# Patient Record
Sex: FEMALE | Race: BLACK OR AFRICAN AMERICAN | NOT HISPANIC OR LATINO | Employment: UNEMPLOYED | URBAN - METROPOLITAN AREA
[De-identification: names, ages, dates, MRNs, and addresses within clinical notes are randomized per-mention and may not be internally consistent; named-entity substitution may affect disease eponyms.]

---

## 2017-09-08 ENCOUNTER — HOSPITAL ENCOUNTER (EMERGENCY)
Facility: HOSPITAL | Age: 42
Discharge: HOME | End: 2017-09-08
Payer: MEDICAID

## 2017-09-08 VITALS
DIASTOLIC BLOOD PRESSURE: 79 MMHG | SYSTOLIC BLOOD PRESSURE: 134 MMHG | BODY MASS INDEX: 22.49 KG/M2 | WEIGHT: 135 LBS | HEIGHT: 65 IN | HEART RATE: 62 BPM | RESPIRATION RATE: 16 BRPM | TEMPERATURE: 98 F | OXYGEN SATURATION: 100 %

## 2017-09-08 DIAGNOSIS — K05.219 GINGIVAL ABSCESS: Primary | ICD-10-CM

## 2017-09-08 LAB
B-HCG UR QL: NEGATIVE
CTP QC/QA: YES

## 2017-09-08 PROCEDURE — 41800 DRAINAGE OF GUM LESION: CPT

## 2017-09-08 PROCEDURE — 99283 EMERGENCY DEPT VISIT LOW MDM: CPT | Mod: 25

## 2017-09-08 PROCEDURE — 25000003 PHARM REV CODE 250

## 2017-09-08 PROCEDURE — 81025 URINE PREGNANCY TEST: CPT | Performed by: EMERGENCY MEDICINE

## 2017-09-08 RX ORDER — PENICILLIN V POTASSIUM 500 MG/1
500 TABLET, FILM COATED ORAL 4 TIMES DAILY
Qty: 40 TABLET | Refills: 0 | Status: SHIPPED | OUTPATIENT
Start: 2017-09-08 | End: 2017-09-15

## 2017-09-08 RX ORDER — PENICILLIN V POTASSIUM 250 MG/1
1000 TABLET, FILM COATED ORAL
Status: COMPLETED | OUTPATIENT
Start: 2017-09-08 | End: 2017-09-08

## 2017-09-08 RX ORDER — LIDOCAINE HYDROCHLORIDE AND EPINEPHRINE 10; 10 MG/ML; UG/ML
10 INJECTION, SOLUTION INFILTRATION; PERINEURAL
Status: COMPLETED | OUTPATIENT
Start: 2017-09-08 | End: 2017-09-08

## 2017-09-08 RX ORDER — OXYCODONE AND ACETAMINOPHEN 5; 325 MG/1; MG/1
1 TABLET ORAL EVERY 4 HOURS PRN
Qty: 10 TABLET | Refills: 0 | Status: SHIPPED | OUTPATIENT
Start: 2017-09-08

## 2017-09-08 RX ADMIN — PENICILLIN V POTASSIUM 1000 MG: 250 TABLET, FILM COATED ORAL at 07:09

## 2017-09-08 RX ADMIN — LIDOCAINE HYDROCHLORIDE,EPINEPHRINE BITARTRATE 10 ML: 10; .01 INJECTION, SOLUTION INFILTRATION; PERINEURAL at 06:09

## 2017-09-08 NOTE — ED TRIAGE NOTES
Pt presents to Ed with C/O an abscess to the right side of her mouth. Pt states that she has been having tooth pain for a couple of days but the facial swelling started yesterday. Denies fever or chills. Will continue to monitor.

## 2017-09-08 NOTE — PROVIDER PROGRESS NOTES - EMERGENCY DEPT.
Encounter Date: 9/8/2017    ED Physician Progress Notes        Physician Note:   Patient now feels significantly improved, agrees to use community resource sheet to follow-up with her dental disorder and noticed return to the ER for worsening symptoms or concern or facial swelling

## 2017-09-08 NOTE — ED PROVIDER NOTES
Encounter Date: 9/8/2017    SCRIBE #1 NOTE: I, Nat De La Rosa, am scribing for, and in the presence of,  Marcus George MD. I have scribed the following portions of the note - Other sections scribed: HPI and ROS.       History     Chief Complaint   Patient presents with    Oral Swelling     Abcess to right upper gum , reports bad tooth x 2 days, denies fever.      CC: Oral Swelling    HPI: This 42 y.o. Female (LMP: 9/3/2017) with lupus presents to the ED c/o acute onset oral swelling to right upper gums that began today. Pt reports 2 day hx of severe dental pain to right upper side of mouth. Pt notes she is being treated for lupus at [facility]. Pt denies fever, chills, sore throat, nausea, and vomiting.       The history is provided by the patient. No  was used.     Review of patient's allergies indicates:  No Known Allergies  Past Medical History:   Diagnosis Date    Lupus      History reviewed. No pertinent surgical history.  History reviewed. No pertinent family history.  Social History   Substance Use Topics    Smoking status: Current Every Day Smoker     Packs/day: 1.00    Smokeless tobacco: Never Used      Comment: 1 pack lasts 2 days    Alcohol use No     Review of Systems   Constitutional: Negative for chills, diaphoresis and fever.   HENT: Positive for dental problem (Oral swelling to right upper gums and severe dental pain to right upper side of mouth). Negative for ear pain and sore throat.    Eyes: Negative for pain.   Respiratory: Negative for cough and shortness of breath.    Cardiovascular: Negative for chest pain.   Gastrointestinal: Negative for abdominal pain, diarrhea, nausea and vomiting.   Genitourinary: Negative for dysuria.   Musculoskeletal: Negative for back pain.   Skin: Negative for rash.   Neurological: Negative for headaches.       Physical Exam     Initial Vitals [09/08/17 0551]   BP Pulse Resp Temp SpO2   137/68 63 18 97.9 °F (36.6 °C) 100 %      MAP       91          Physical Exam well-developed well-nourished black female alert oriented ×3  HEENT exam pupils reactive nonicteric TMs gray nares benign, maxillary dentition broken off at the gingival margins all teeth very few teeth a 1 x 0.5 cm pocket right lateral maxillary ridge slightly fluctuant,no facial swelling  Posterior pharynx benign  Neck no significant adenopathy  Lungs clear no rales rhonchi or wheezing  Cardiovascular regular rate and rhythm no murmur rub or gallop  Abdomen bowel sounds positive soft nontender no masses or hepatosplenomegaly  Skin with chronic occasional patches of hypopigmented regions approximately one half by 1/2 cm over upper torso and lower extremities no evidence of increased warmth or fluctuance  Muscular skeletal without deformity    ED Course   I & D - Incision and Drainage  Date/Time: 9/8/2017 7:09 AM  Performed by: MALINDA WEINSTEIN  Authorized by: MALINDA WEINSTEIN   Consent Done: Not Needed  Type: abscess  Location: Right maxillary gingiva.  Anesthesia: local infiltration    Anesthesia:  Local Anesthetic: lidocaine 1% with epinephrine  Patient sedated: no  Risk factor: underlying major nerve and  underlying major vessel  Scalpel size: 11  Incision type: single straight  Complexity: simple  Drainage: pus  Drainage amount: moderate  Wound treatment: wound left open        Labs Reviewed   POCT URINE PREGNANCY             MDM patient with history of lupus and very poor dental hygiene complains of 2 day history of hot and cold intolerance hurts to chew on that side of the last 24 hours and swelling along the gingival margin on the right maxillary ridge concerning for an abscess             Scribe Attestation:   Scribe #1: I performed the above scribed service and the documentation accurately describes the services I performed. I attest to the accuracy of the note.    Attending Attestation:           Physician Attestation for Scribe:  Physician Attestation Statement for Scribe #1: Malinda CARRERA  MD Ariel, reviewed documentation, as scribed by Nat De La Rosa in my presence, and it is both accurate and complete.                 ED Course      Clinical Impression:   The encounter diagnosis was Gingival abscess.                           Marcus George MD  09/08/17 0714       Marcus George MD  09/08/17 0714

## 2019-08-07 ENCOUNTER — HOSPITAL ENCOUNTER (EMERGENCY)
Facility: HOSPITAL | Age: 44
Discharge: HOME | End: 2019-08-07
Attending: EMERGENCY MEDICINE
Payer: MEDICAID

## 2019-08-07 VITALS
TEMPERATURE: 98 F | SYSTOLIC BLOOD PRESSURE: 114 MMHG | HEART RATE: 61 BPM | HEIGHT: 66 IN | DIASTOLIC BLOOD PRESSURE: 65 MMHG | BODY MASS INDEX: 20.89 KG/M2 | WEIGHT: 130 LBS | OXYGEN SATURATION: 97 % | RESPIRATION RATE: 18 BRPM

## 2019-08-07 DIAGNOSIS — N39.0 URINARY TRACT INFECTION WITHOUT HEMATURIA, SITE UNSPECIFIED: Primary | ICD-10-CM

## 2019-08-07 LAB
B-HCG UR QL: NEGATIVE
BILIRUB UR QL STRIP: NEGATIVE
CLARITY UR: CLEAR
COLOR UR: YELLOW
CTP QC/QA: YES
GLUCOSE UR QL STRIP: NEGATIVE
HGB UR QL STRIP: ABNORMAL
KETONES UR QL STRIP: NEGATIVE
LEUKOCYTE ESTERASE UR QL STRIP: ABNORMAL
MICROSCOPIC COMMENT: ABNORMAL
NITRITE UR QL STRIP: NEGATIVE
PH UR STRIP: 5 [PH] (ref 5–8)
PROT UR QL STRIP: NEGATIVE
RBC #/AREA URNS HPF: 0 /HPF (ref 0–4)
SP GR UR STRIP: 1.02 (ref 1–1.03)
URN SPEC COLLECT METH UR: ABNORMAL
UROBILINOGEN UR STRIP-ACNC: ABNORMAL EU/DL
WBC #/AREA URNS HPF: 25 /HPF (ref 0–5)
WBC CLUMPS URNS QL MICRO: ABNORMAL

## 2019-08-07 PROCEDURE — 87186 SC STD MICRODIL/AGAR DIL: CPT

## 2019-08-07 PROCEDURE — 81025 URINE PREGNANCY TEST: CPT | Performed by: EMERGENCY MEDICINE

## 2019-08-07 PROCEDURE — 87077 CULTURE AEROBIC IDENTIFY: CPT

## 2019-08-07 PROCEDURE — 81000 URINALYSIS NONAUTO W/SCOPE: CPT

## 2019-08-07 PROCEDURE — 87088 URINE BACTERIA CULTURE: CPT

## 2019-08-07 PROCEDURE — 99284 EMERGENCY DEPT VISIT MOD MDM: CPT

## 2019-08-07 PROCEDURE — 87086 URINE CULTURE/COLONY COUNT: CPT

## 2019-08-07 RX ORDER — PHENAZOPYRIDINE HYDROCHLORIDE 200 MG/1
200 TABLET, FILM COATED ORAL 3 TIMES DAILY
Qty: 21 TABLET | Refills: 0 | Status: SHIPPED | OUTPATIENT
Start: 2019-08-07 | End: 2019-08-14

## 2019-08-07 RX ORDER — CEPHALEXIN 500 MG/1
500 CAPSULE ORAL EVERY 12 HOURS
Qty: 14 CAPSULE | Refills: 0 | Status: SHIPPED | OUTPATIENT
Start: 2019-08-07 | End: 2019-08-14

## 2019-08-07 NOTE — ED PROVIDER NOTES
Encounter Date: 2019    SCRIBE #1 NOTE: I, Jim Mckeon, am scribing for, and in the presence of,  Yan Fleming, CAROLYN. I have scribed the following portions of the note - Other sections scribed: HPI, ROS, PE.       History     Chief Complaint   Patient presents with    Dysuria     Dysuria with urinary frequency x 3 days.  Denies nausea, fever, or chills.     44 y.o. female with PMHx of Lupus presents to the ED with complaints of dysuria and associated urgency and frequency beginning 3 days ago.  Patient denies N/V/D, constipation, fever, chills, vaginal bleeding, or abdominal pain.Patient takes Lupus medication regularly and reports having control of the Sx. She has not had a Lupus flare up since she's been diagnosed 7 years ago. Her last period was 1 month ago. NKDA.  Patient states she has not been as sexually active in approximately 5 years and is not concerned for STI at this time.          Review of patient's allergies indicates:  No Known Allergies  Past Medical History:   Diagnosis Date    Lupus      Past Surgical History:   Procedure Laterality Date     SECTION, CLASSIC       History reviewed. No pertinent family history.  Social History     Tobacco Use    Smoking status: Current Every Day Smoker     Packs/day: 1.00    Smokeless tobacco: Never Used    Tobacco comment: 1 pack lasts 2 days   Substance Use Topics    Alcohol use: No    Drug use: No     Review of Systems   Constitutional: Negative for fever.   HENT: Negative for sore throat.    Respiratory: Negative for shortness of breath.    Cardiovascular: Negative for chest pain.   Gastrointestinal: Negative for constipation, diarrhea, nausea and vomiting.   Genitourinary: Positive for dysuria, frequency and urgency. Negative for vaginal bleeding.   Musculoskeletal: Negative for back pain.   Skin: Negative for rash.   Neurological: Negative for weakness.   Hematological: Does not bruise/bleed easily.       Physical Exam     Initial Vitals  [08/07/19 0941]   BP Pulse Resp Temp SpO2   (!) 145/69 68 16 98.3 °F (36.8 °C) 100 %      MAP       --         Physical Exam    Nursing note and vitals reviewed.  Constitutional: She appears well-developed and well-nourished. No distress.   HENT:   Head: Normocephalic and atraumatic.   Nose: Nose normal.   Mouth/Throat: Oropharynx is clear and moist.   Eyes: EOM are normal. Pupils are equal, round, and reactive to light.   Neck: Normal range of motion. Neck supple.   Cardiovascular: Normal rate, regular rhythm and normal heart sounds. Exam reveals no gallop and no friction rub.    No murmur heard.  Pulmonary/Chest: Breath sounds normal. No respiratory distress. She has no wheezes. She has no rhonchi. She has no rales.   Abdominal: Soft. Bowel sounds are normal. There is no tenderness. There is no rebound and no guarding.   Musculoskeletal: Normal range of motion.   Neurological: She is alert and oriented to person, place, and time. She has normal strength. No cranial nerve deficit or sensory deficit.   Skin: Skin is warm and dry. Capillary refill takes less than 2 seconds.   Psychiatric: She has a normal mood and affect.         ED Course   Procedures  Labs Reviewed   URINALYSIS, REFLEX TO URINE CULTURE - Abnormal; Notable for the following components:       Result Value    Occult Blood UA 1+ (*)     Urobilinogen, UA 2.0-3.0 (*)     Leukocytes, UA 2+ (*)     All other components within normal limits    Narrative:     Preferred Collection Type->Urine, Clean Catch   URINALYSIS MICROSCOPIC - Abnormal; Notable for the following components:    WBC, UA 25 (*)     All other components within normal limits    Narrative:     Preferred Collection Type->Urine, Clean Catch   CULTURE, URINE   POCT URINE PREGNANCY          Imaging Results    None          Medical Decision Making:   Differential Diagnosis:   Differential diagnosis includes but is not limited to:  UTI, pyelonephritis, STI, PID, TOA, vaginitis  Clinical Tests:   Lab  Tests: Ordered and Reviewed  ED Management:  This is an evaluation of a 44 y.o. female who presents to the ED for dysuria.  Vital signs are stable.   Afebrile.  Patient is nontoxic appearing and in no acute distress. Abdomen is soft and nontender to palpation. Lungs clear to auscultation.  Heart sounds are normal. UPT negative. UA shows 2+ leukocytes with 25 WBCs on microscopic analysis.  Will treat patient for UTI with Keflex.  Also discharge patient will peridium.  Will encourage patient follow-up PCP.    Patient given return precautions and instructed to return to the emergency department for any new or worsening symptoms. Patient verbalized understanding and agreed with plan.                         Clinical Impression:       ICD-10-CM ICD-9-CM   1. Urinary tract infection without hematuria, site unspecified N39.0 599.0                                Yan Fleming PA-C  08/07/19 7147

## 2019-08-09 LAB — BACTERIA UR CULT: ABNORMAL

## 2020-03-26 ENCOUNTER — HOSPITAL ENCOUNTER (EMERGENCY)
Facility: HOSPITAL | Age: 45
Discharge: HOME | End: 2020-03-26
Attending: EMERGENCY MEDICINE
Payer: MEDICAID

## 2020-03-26 VITALS
OXYGEN SATURATION: 100 % | RESPIRATION RATE: 16 BRPM | BODY MASS INDEX: 22.94 KG/M2 | SYSTOLIC BLOOD PRESSURE: 161 MMHG | DIASTOLIC BLOOD PRESSURE: 87 MMHG | HEART RATE: 84 BPM | TEMPERATURE: 99 F | WEIGHT: 140 LBS

## 2020-03-26 DIAGNOSIS — R20.0 LEFT UPPER EXTREMITY NUMBNESS: Primary | ICD-10-CM

## 2020-03-26 PROCEDURE — 99283 EMERGENCY DEPT VISIT LOW MDM: CPT | Mod: ER

## 2020-03-26 RX ORDER — HYDROXYCHLOROQUINE SULFATE 200 MG/1
200 TABLET, FILM COATED ORAL 2 TIMES DAILY
Qty: 28 TABLET | Refills: 0 | Status: SHIPPED | OUTPATIENT
Start: 2020-03-26 | End: 2020-04-09

## 2020-03-26 NOTE — DISCHARGE INSTRUCTIONS
Thank you for allowing me to care for you today.  I hope our treatment plan will make you feel better in the next few days.  In order for me to take better care of my future patients and improve our Emergency Department, I would appreciate if you can provide us with feedback.  In the next few days, you may receive a survey in the mail.  If you do, it would mean a great deal to me if you would please take the time to complete it.    Thank you and I hope you feel better.  Dunia Hagen NP

## 2020-03-26 NOTE — ED NOTES
Patient got into an argument with her family in the lobby and left the ED. Provider saw the patient in the lobby. Patient left without her velcro splint and discharge paperwork.

## 2020-03-26 NOTE — ED PROVIDER NOTES
"Encounter Date: 3/26/2020    SCRIBE #1 NOTE: I, Edward Ray, am scribing for, and in the presence of,  Dunia Hagen NP. I have scribed the following portions of the note - Other sections scribed: HPI, ROS, PE.       History     Chief Complaint   Patient presents with    Numbness     to lower part of left arm for two days. HX of lupus     45 year old female with lupus presents to the ED with a constant numbness to her left forearm onset 3 days. Denies any trauma/injury and has never felt this numbness in the past. States her arm feels "tight." Denies any pain to her arms, weakness, or visual changes. Has not taken any medications for symptoms. Patient says she was supposed to visit [facility], but missed her appointment too many times and requests to refill her medications for her lupus. Patient is a liane at work.    The history is provided by the patient. No  was used.     Review of patient's allergies indicates:  No Known Allergies  Past Medical History:   Diagnosis Date    Lupus      Past Surgical History:   Procedure Laterality Date     SECTION, CLASSIC       No family history on file.  Social History     Tobacco Use    Smoking status: Current Every Day Smoker     Packs/day: 0.50     Types: Cigarettes    Smokeless tobacco: Never Used    Tobacco comment: 1 pack lasts 2 days   Substance Use Topics    Alcohol use: No     Comment: occass    Drug use: No     Review of Systems   Constitutional: Negative for chills and fever.   HENT: Negative for congestion.    Eyes: Negative for visual disturbance.   Respiratory: Negative for cough and wheezing.    Gastrointestinal: Negative for abdominal pain, nausea and vomiting.   Genitourinary: Negative for dysuria.   Musculoskeletal: Negative for arthralgias and myalgias.   Skin: Negative for wound.   Neurological: Positive for numbness. Negative for weakness.   All other systems reviewed and are negative.      Physical Exam     Initial " Vitals [03/26/20 1543]   BP Pulse Resp Temp SpO2   (!) 161/87 84 16 98.7 °F (37.1 °C) 100 %      MAP       --         Physical Exam    Nursing note and vitals reviewed.  Constitutional: She appears well-developed and well-nourished. She is active.  Non-toxic appearance. She does not appear ill.   The patient is alert, oriented, and speaking in complete sentences.  No apparent distress.   HENT:   Head: Normocephalic and atraumatic.   Right Ear: Hearing and external ear normal.   Left Ear: Hearing and external ear normal.   Nose: Nose normal.   Mouth/Throat: Uvula is midline, oropharynx is clear and moist and mucous membranes are normal. No tonsillar abscesses.   No facial asymmetry   Eyes: Conjunctivae and EOM are normal. Pupils are equal, round, and reactive to light. Right eye exhibits normal extraocular motion. Left eye exhibits normal extraocular motion. Right pupil is round and reactive. Left pupil is round and reactive. Pupils are equal.   Neck: Normal range of motion and full passive range of motion without pain. Neck supple. No neck rigidity.   No meningismus   Cardiovascular: Normal rate, regular rhythm, normal heart sounds and normal pulses. Exam reveals no gallop and no friction rub.    No murmur heard.  Pulses:       Radial pulses are 2+ on the right side, and 2+ on the left side.        Dorsalis pedis pulses are 2+ on the right side, and 2+ on the left side.   Pulmonary/Chest: Effort normal and breath sounds normal. No respiratory distress. She has no decreased breath sounds. She has no wheezes. She has no rhonchi. She has no rales.   Abdominal: Soft. Bowel sounds are normal. She exhibits no distension. There is no tenderness.   Musculoskeletal: Normal range of motion.   No sign of injury to left forearm with no erythema, swelling, or ecchymosis. Warmth to touch.  Cap refill is within normal limits.  Full range of motion to all joints of left upper extremity.    Neurological: She is alert and oriented to  person, place, and time. She has normal strength. No cranial nerve deficit or sensory deficit. She exhibits normal muscle tone. She displays a negative Romberg sign. She displays no seizure activity. Coordination and gait normal. GCS score is 15. GCS eye subscore is 4. GCS verbal subscore is 5. GCS motor subscore is 6.   No sensory deficits.  Muscular strength is normal and equal bilaterally.  No pronator drift.  Gait is steady and even.  Finger to nose, heel to shin, and alternating hands are all within normal limits.    Negative Tinel's and negative Phalen's.   Skin: Skin is warm, dry and intact. Capillary refill takes less than 2 seconds. No rash noted.   Psychiatric: She has a normal mood and affect. Her speech is normal and behavior is normal. Judgment and thought content normal. Cognition and memory are normal.         ED Course   Procedures  Labs Reviewed   POCT URINE PREGNANCY          Imaging Results    None          Medical Decision Making:   History:   Old Medical Records: I decided to obtain old medical records.  Clinical Tests:   Lab Tests: Ordered and Reviewed  The following lab test(s) were unremarkable: UPT  ED Management:  This is an emergent evaluation of a 45-year-old female who presents to the ED with complaints of left forearm numbness for the past several days.    Physical exam is benign.  There are no focal neurologic deficits.  Negative Phalen's and Tinel's.    Based on history and physical exam, I considered but do not suspect CVA, ICH, or meningitis.  Clinical presentation suggests possible carpal tunnel, tendinitis, or other orthopedic cause of the patient's symptoms.  Plan was to place Velcro wrist splint and have her follow-up with primary care and Orthopedics.  However, she left after having a verbal altercation with a family member before we were able to give discharge instructions or give her the Velcro splint.  Case discussed with supervising physician.                Aaliyah  Attestation:   Scribe #1: I performed the above scribed service and the documentation accurately describes the services I performed. I attest to the accuracy of the note.    I, AC Sigala, WILLIAMP-BC, SERINA-BC, personally performed the services described in this documentation. All medical record entries made by the scribe were at my direction and in my presence. I have reviewed the chart and agree that the record reflects my personal performance and is accurate and complete. AC Sigala, WILLIAMP-BC, ESTEPHANIAP-BC 5:36 PM 03/26/2020                      Clinical Impression:     1. Left upper extremity numbness                                   Dunia Hagen NP  03/26/20 [id number]

## 2020-06-20 ENCOUNTER — HOSPITAL ENCOUNTER (EMERGENCY)
Facility: HOSPITAL | Age: 45
Discharge: HOME | End: 2020-06-20
Attending: EMERGENCY MEDICINE
Payer: MEDICAID

## 2020-06-20 VITALS
BODY MASS INDEX: 21.66 KG/M2 | HEART RATE: 70 BPM | WEIGHT: 130 LBS | TEMPERATURE: 98 F | HEIGHT: 65 IN | OXYGEN SATURATION: 100 % | RESPIRATION RATE: 18 BRPM | DIASTOLIC BLOOD PRESSURE: 77 MMHG | SYSTOLIC BLOOD PRESSURE: 169 MMHG

## 2020-06-20 DIAGNOSIS — R05.9 COUGH: ICD-10-CM

## 2020-06-20 DIAGNOSIS — B34.9 VIRAL SYNDROME: Primary | ICD-10-CM

## 2020-06-20 LAB — SARS-COV-2 RDRP RESP QL NAA+PROBE: NEGATIVE

## 2020-06-20 PROCEDURE — 99284 EMERGENCY DEPT VISIT MOD MDM: CPT | Mod: 25

## 2020-06-20 PROCEDURE — U0002 COVID-19 LAB TEST NON-CDC: HCPCS

## 2020-06-20 RX ORDER — BENZONATATE 100 MG/1
100 CAPSULE ORAL 3 TIMES DAILY PRN
Qty: 20 CAPSULE | Refills: 0 | Status: SHIPPED | OUTPATIENT
Start: 2020-06-20

## 2020-06-20 RX ORDER — ACETAMINOPHEN 500 MG
500 TABLET ORAL EVERY 4 HOURS PRN
Qty: 30 TABLET | Refills: 0 | OUTPATIENT
Start: 2020-06-20 | End: 2021-12-15

## 2020-06-20 RX ORDER — CETIRIZINE HYDROCHLORIDE 10 MG/1
10 TABLET ORAL DAILY
Qty: 30 TABLET | Refills: 0 | Status: SHIPPED | OUTPATIENT
Start: 2020-06-20

## 2020-06-20 RX ORDER — GUAIFENESIN/DEXTROMETHORPHAN 100-10MG/5
5 SYRUP ORAL 4 TIMES DAILY PRN
Qty: 120 ML | Refills: 0 | Status: SHIPPED | OUTPATIENT
Start: 2020-06-20 | End: 2020-06-30

## 2020-06-20 RX ORDER — HYDROXYCHLOROQUINE SULFATE 200 MG/1
200 TABLET, FILM COATED ORAL
COMMUNITY
Start: 2014-04-14

## 2020-06-20 RX ORDER — FLUTICASONE PROPIONATE 50 MCG
1 SPRAY, SUSPENSION (ML) NASAL 2 TIMES DAILY PRN
Qty: 15 G | Refills: 0 | Status: SHIPPED | OUTPATIENT
Start: 2020-06-20

## 2020-06-20 RX ORDER — TRAMADOL HYDROCHLORIDE AND ACETAMINOPHEN 37.5; 325 MG/1; MG/1
1 TABLET, FILM COATED ORAL EVERY 4 HOURS
COMMUNITY

## 2020-06-20 NOTE — DISCHARGE INSTRUCTIONS

## 2020-06-20 NOTE — ED PROVIDER NOTES
Encounter Date: 2020    SCRIBE #1 NOTE: I, Elise Briceño, am scribing for, and in the presence of,  Suhail Israel NP. I have scribed the following portions of the note - Other sections scribed: HPI/ROS/PE.       History     Chief Complaint   Patient presents with    Nasal Congestion     cold and cough x 5 days.   + vomiting x 5 days ago.   denies today.  denies fever or chills.     Pt seen by provider at 09:28AM    This 45 y.o. female with a medical history of Lupus presents to the ED for an emergent evaluation of a cough and nasal congestion x 5 days. Pt states she has a cold. Pt reports n/v at initial onset of symptoms, but n/v have now resolved. No recent, known sick contacts. No modifying factors. Pt attempted tx with Theraflu previously. Of note, pt reports one of her grandchildren was sick ~1 week ago. Otherwise, pt denies fever, chills, rhinorrhea, sore throat, ear pain, chest pain, SOB, abdominal pain, diarrhea, and any other associated symptoms.    The history is provided by the patient. No  was used.     Review of patient's allergies indicates:  No Known Allergies  Past Medical History:   Diagnosis Date    Lupus      Past Surgical History:   Procedure Laterality Date     SECTION, CLASSIC       History reviewed. No pertinent family history.  Social History     Tobacco Use    Smoking status: Current Every Day Smoker     Packs/day: 0.50     Types: Cigarettes    Smokeless tobacco: Never Used    Tobacco comment: 1 pack lasts 2 days   Substance Use Topics    Alcohol use: Yes     Comment: occass    Drug use: Yes     Types: Marijuana     Review of Systems   Constitutional: Negative for chills and fever.   HENT: Positive for congestion. Negative for ear pain, rhinorrhea and sore throat.    Eyes: Negative for visual disturbance.   Respiratory: Positive for cough. Negative for shortness of breath.    Cardiovascular: Negative for chest pain.   Gastrointestinal: Positive for  nausea (now resolved) and vomiting (now resolved). Negative for abdominal pain and diarrhea.   Genitourinary: Negative for difficulty urinating and dysuria.   Musculoskeletal: Negative for neck stiffness.   Skin: Negative for rash.   Neurological: Negative for headaches.       Physical Exam     Initial Vitals [06/20/20 0916]   BP Pulse Resp Temp SpO2   (!) 140/78 67 16 98.4 °F (36.9 °C) 100 %      MAP       --         Physical Exam    Nursing note and vitals reviewed.  Constitutional: She appears well-developed and well-nourished. She is not diaphoretic. She is active and cooperative. She does not have a sickly appearance. She does not appear ill. No distress.   HENT:   Head: Normocephalic and atraumatic.   Right Ear: External ear normal.   Left Ear: External ear normal.   Nose: Nose normal.   Mouth/Throat: Oropharynx is clear and moist.   TMs and ear canals are normal bilaterally.  No frontal or maxillary sinus tenderness.  No oropharyngeal abnormalities.  No trismus.  Tolerating secretions without difficulty.   Eyes: Conjunctivae and EOM are normal. Pupils are equal, round, and reactive to light. Right eye exhibits no discharge. Left eye exhibits no discharge.   Neck: Trachea normal, normal range of motion, full passive range of motion without pain and phonation normal. Neck supple. No stridor present. No tracheal tenderness, no spinous process tenderness and no muscular tenderness present. No tracheal deviation, no edema, no erythema and normal range of motion present. No neck rigidity.   Neck is supple with nonpainful active and passive range of motion.  No nuchal rigidity.  No stridor or drooling.  No change in phonation.   Cardiovascular: Normal rate and regular rhythm.   Pulmonary/Chest: Effort normal and breath sounds normal. No stridor. No tachypnea and no bradypnea. No respiratory distress.   Lungs are clear to auscultation bilaterally in all fields.  Respiratory effort is normal.   Abdominal: Soft. She  exhibits no distension. There is no abdominal tenderness.   Abdominal exam is benign.   Musculoskeletal: Normal range of motion. No tenderness or edema.   Neurological: She is alert and oriented to person, place, and time. She has normal strength. No cranial nerve deficit or sensory deficit.   Skin: Skin is warm and dry. No rash noted.   Psychiatric: She has a normal mood and affect. Her behavior is normal. Judgment and thought content normal.         ED Course   Procedures  Labs Reviewed   SARS-COV-2 RNA AMPLIFICATION, QUAL          Imaging Results          X-Ray Chest AP Portable (Final result)  Result time 06/20/20 10:00:56    Final result by Geri Gtz MD (06/20/20 10:00:56)                 Impression:      No acute cardiopulmonary abnormality.      Electronically signed by: Geri Gtz  Date:    06/20/2020  Time:    10:00             Narrative:    EXAMINATION:  XR CHEST AP PORTABLE    CLINICAL HISTORY:  Cough    TECHNIQUE:  Single view of the chest was obtained.    COMPARISON:  None    FINDINGS:  Normal cardiomediastinal contour. No focal consolidation, pleural effusion or pneumothorax.                                 Medical Decision Making:   History:   Old Medical Records: I decided to obtain old medical records.  Clinical Tests:   Lab Tests: Ordered and Reviewed  Radiological Study: Ordered and Reviewed  ED Management:  DDx:  Viral syndrome, COVID-19, strep pharyngitis, viral pharyngitis, otitis media, sinusitis, pneumonia, bronchitis, meningitis, sepsis, others    HPI and physical exam as above.      The patient appears to have a viral infection.  Given the widespread activity of the COVID-19 pandemic in our area at this time there is a possibility that it is the etiology of the patient's symptoms.  Based upon the history and physical exam the patient does not appear to have a serious bacterial infection such as sepsis, otitis media, bacterial sinusitis, strep pharyngitis, parapharyngeal or  peritonsillar abscess, meningitis. Chest x-ray shows no evidence of pneumonia or other acute abnormality.  Respiratory effort is normal. Lungs are clear to auscultation bilaterally in all fields. Mucous membranes are moist and the patient is tolerating P.O. without difficulty.  The patient's RNA amplification test for SARS-COV-2 was negative, however there is a possibility of this being a false negative.  Patient is afebrile.  Patient is nontoxic, alert, active, and appears very well at this time just prior to discharge.  Room air oxygen saturation 100%.  I have given specific return precautions to the patient regarding dyspnea.  I will prescribe medications to treat the patient's symptoms.     Advised patient to wear a mask at all times if she must be around others.  Advised patient to follow up with her PCP for re-evaluation and further management.  ED return precautions given. All questions regarding diagnosis and plan were answered to the patient's fullest possible satisfaction. Patient expressed understanding of diagnosis, discharge instructions, and return precautions.            Patient note was created using [employer] voice dictation software.  Any errors in syntax or information may not have been identified and edited prior to signing this note.              Scribe Attestation:   Scribe #1: I performed the above scribed service and the documentation accurately describes the services I performed. I attest to the accuracy of the note.                          Clinical Impression:       ICD-10-CM ICD-9-CM   1. Viral syndrome  B34.9 079.99   2. Cough  R05 786.2         Disposition:   Disposition: Discharged  Condition: Stable   Scribe Attestation: I, Suhail Israel NP, personally performed the services described in this documentation. All medical record entries made by the scribe were at my direction and in my presence. I have reviewed the chart and agree that the record reflects my personal performance and is  accurate and complete.   ED Disposition Condition    Discharge Stable        ED Prescriptions     Medication Sig Dispense Start Date End Date Auth. Provider    acetaminophen (TYLENOL) 500 MG tablet Take 1 tablet (500 mg total) by mouth every 4 (four) hours as needed (Fever). 30 tablet 6/20/2020  Suhail Israel NP    benzonatate (TESSALON) 100 MG capsule Take 1 capsule (100 mg total) by mouth 3 (three) times daily as needed for Cough. 20 capsule 6/20/2020  Suhail Israel NP    cetirizine (ZYRTEC) 10 MG tablet Take 1 tablet (10 mg total) by mouth once daily. 30 tablet 6/20/2020  Suhail Israel NP    dextromethorphan-guaifenesin  mg/5 ml (ROBITUSSIN-DM)  mg/5 mL liquid Take 5 mLs by mouth 4 (four) times daily as needed (cough). 120 mL 6/20/2020 6/30/2020 Suhail Israel NP    fluticasone propionate (FLONASE) 50 mcg/actuation nasal spray 1 spray (50 mcg total) by Each Nostril route 2 (two) times daily as needed. 15 g 6/20/2020  Suhail Israel NP        Follow-up Information     Follow up With Specialties Details Why Contact Info    [facility]  Schedule an appointment as soon as possible for a visit in 1 week For further evaluation [address] [place] [address]  [phone]      [facility] Emergency Medicine Go to  If symptoms worsen, As needed [address] [facility] [id number] [phone]                                     Suhail Israel NP  06/21/20 4075

## 2020-06-20 NOTE — ED TRIAGE NOTES
"Presents with cough and nasal congestion "since about Tuesday". Pt also reports that she has vomited this week, denies fever, SOB.   "

## 2021-04-16 ENCOUNTER — PATIENT MESSAGE (OUTPATIENT)
Dept: RESEARCH | Facility: HOSPITAL | Age: 46
End: 2021-04-16

## 2021-12-15 ENCOUNTER — HOSPITAL ENCOUNTER (EMERGENCY)
Facility: HOSPITAL | Age: 46
Discharge: HOME | End: 2021-12-15
Attending: EMERGENCY MEDICINE
Payer: MEDICAID

## 2021-12-15 VITALS
HEART RATE: 71 BPM | BODY MASS INDEX: 24.16 KG/M2 | TEMPERATURE: 98 F | DIASTOLIC BLOOD PRESSURE: 74 MMHG | OXYGEN SATURATION: 99 % | RESPIRATION RATE: 18 BRPM | SYSTOLIC BLOOD PRESSURE: 150 MMHG | WEIGHT: 145 LBS | HEIGHT: 65 IN

## 2021-12-15 DIAGNOSIS — K08.89 PAIN, DENTAL: Primary | ICD-10-CM

## 2021-12-15 DIAGNOSIS — L03.211 FACIAL CELLULITIS: ICD-10-CM

## 2021-12-15 LAB
B-HCG UR QL: NEGATIVE
CTP QC/QA: YES

## 2021-12-15 PROCEDURE — 25000003 PHARM REV CODE 250: Performed by: PHYSICIAN ASSISTANT

## 2021-12-15 PROCEDURE — 81025 URINE PREGNANCY TEST: CPT | Performed by: NURSE PRACTITIONER

## 2021-12-15 PROCEDURE — 99284 EMERGENCY DEPT VISIT MOD MDM: CPT

## 2021-12-15 RX ORDER — AMOXICILLIN 500 MG/1
500 CAPSULE ORAL 3 TIMES DAILY
Qty: 21 CAPSULE | Refills: 0 | Status: SHIPPED | OUTPATIENT
Start: 2021-12-15 | End: 2021-12-22

## 2021-12-15 RX ORDER — IBUPROFEN 600 MG/1
600 TABLET ORAL EVERY 6 HOURS PRN
Qty: 20 TABLET | Refills: 0 | Status: SHIPPED | OUTPATIENT
Start: 2021-12-15 | End: 2021-12-20

## 2021-12-15 RX ORDER — IBUPROFEN 600 MG/1
600 TABLET ORAL
Status: COMPLETED | OUTPATIENT
Start: 2021-12-15 | End: 2021-12-15

## 2021-12-15 RX ORDER — ACETAMINOPHEN 500 MG
500 TABLET ORAL EVERY 4 HOURS PRN
Qty: 20 TABLET | Refills: 0 | Status: SHIPPED | OUTPATIENT
Start: 2021-12-15 | End: 2021-12-20

## 2021-12-15 RX ORDER — AMOXICILLIN 250 MG/1
500 CAPSULE ORAL
Status: COMPLETED | OUTPATIENT
Start: 2021-12-15 | End: 2021-12-15

## 2021-12-15 RX ADMIN — AMOXICILLIN 500 MG: 250 CAPSULE ORAL at 03:12

## 2021-12-15 RX ADMIN — IBUPROFEN 600 MG: 600 TABLET, FILM COATED ORAL at 03:12

## 2022-08-26 ENCOUNTER — HOSPITAL ENCOUNTER (EMERGENCY)
Facility: HOSPITAL | Age: 47
Discharge: HOME | End: 2022-08-26
Attending: EMERGENCY MEDICINE
Payer: MEDICAID

## 2022-08-26 VITALS
TEMPERATURE: 98 F | BODY MASS INDEX: 21.66 KG/M2 | RESPIRATION RATE: 18 BRPM | OXYGEN SATURATION: 100 % | WEIGHT: 130 LBS | SYSTOLIC BLOOD PRESSURE: 111 MMHG | HEART RATE: 60 BPM | HEIGHT: 65 IN | DIASTOLIC BLOOD PRESSURE: 69 MMHG

## 2022-08-26 DIAGNOSIS — N30.01 ACUTE CYSTITIS WITH HEMATURIA: Primary | ICD-10-CM

## 2022-08-26 LAB
B-HCG UR QL: NEGATIVE
BILIRUBIN, POC UA: NEGATIVE
BLOOD, POC UA: ABNORMAL
CLARITY, POC UA: CLEAR
COLOR, POC UA: YELLOW
CTP QC/QA: YES
GLUCOSE, POC UA: NEGATIVE
KETONES, POC UA: NEGATIVE
LEUKOCYTE EST, POC UA: ABNORMAL
NITRITE, POC UA: NEGATIVE
PH UR STRIP: 7 [PH]
PROTEIN, POC UA: NEGATIVE
SPECIFIC GRAVITY, POC UA: 1.02
UROBILINOGEN, POC UA: 0.2 E.U./DL

## 2022-08-26 PROCEDURE — 25000003 PHARM REV CODE 250: Mod: ER | Performed by: NURSE PRACTITIONER

## 2022-08-26 PROCEDURE — 81003 URINALYSIS AUTO W/O SCOPE: CPT | Mod: ER

## 2022-08-26 PROCEDURE — 81025 URINE PREGNANCY TEST: CPT | Mod: ER | Performed by: EMERGENCY MEDICINE

## 2022-08-26 PROCEDURE — 99284 EMERGENCY DEPT VISIT MOD MDM: CPT | Mod: 25,ER

## 2022-08-26 PROCEDURE — 87086 URINE CULTURE/COLONY COUNT: CPT | Performed by: NURSE PRACTITIONER

## 2022-08-26 RX ORDER — PHENAZOPYRIDINE HYDROCHLORIDE 200 MG/1
200 TABLET, FILM COATED ORAL 3 TIMES DAILY
Qty: 6 TABLET | Refills: 0 | Status: SHIPPED | OUTPATIENT
Start: 2022-08-26 | End: 2022-08-28

## 2022-08-26 RX ORDER — CEPHALEXIN 500 MG/1
500 CAPSULE ORAL
Status: COMPLETED | OUTPATIENT
Start: 2022-08-26 | End: 2022-08-26

## 2022-08-26 RX ORDER — CEPHALEXIN 500 MG/1
500 CAPSULE ORAL 4 TIMES DAILY
Qty: 20 CAPSULE | Refills: 0 | Status: SHIPPED | OUTPATIENT
Start: 2022-08-26 | End: 2022-08-31

## 2022-08-26 RX ADMIN — CEPHALEXIN 500 MG: 500 CAPSULE ORAL at 08:08

## 2022-08-26 NOTE — DISCHARGE INSTRUCTIONS
Thank you for coming to our Emergency Department today. It is important to remember that some problems or medical conditions are difficult to diagnose and may not be found during your Emergency Department visit.     Be sure to follow up with your primary care doctor and review all labs/imaging/tests that were performed during your ER visit with them. Some labs/tests may be outside of the normal range and require non-emergent follow-up and further investigation to help diagnose/exclude/prevent complications or other potentially serious medical conditions that were not addressed during your ER visit.    If you do not have a primary care doctor, you may contact the one listed on your discharge paperwork or you may also call the [facility] Appointment Desk at [phone] to schedule an appointment and establish care with one. It is important to your health that you have a primary care doctor.    Please take all medications as directed. All medications may potentially have side-effects and it is impossible to predict which medications may give you side-effects or what side-effects (if any) they will give you.. If you feel that you are having a negative effect or side-effect of any medication you should immediately stop taking them and seek medical attention. If you feel that you are having a life-threatening reaction call 911.    Return to the ER with any questions/concerns, new/concerning symptoms, worsening or failure to improve.     Do not drive, swim, climb to height, take a bath, operate heavy machinery, drink alcohol or take potentially sedating medications, sign any legal documents or make any important decisions for 24 hours if you have received any pain medications, sedatives or mood altering drugs during your ER visit or within 24 hours of taking them if they have been prescribed to you.     You can find additional resources for Dentists, hearing aids, durable medical equipment, low cost pharmacies and  other resources at https://[facility].org    BELOW THIS LINE ONLY APPLIES IF YOU HAVE A COVID TEST PENDING OR IF YOU HAVE BEEN DIAGNOSED WITH COVID:  Please access [facility] to review the results of your test. Until the results of your COVID test return, you should isolate yourself so as not to potentially spread illness to others.   If your COVID test returns positive, you should isolate yourself so as not to spread illness to others. After five full days, if you are feeling better and you have not had fever for 24 hours, you can return to your typical daily activities, but you must wear a mask around others for an additional 5 days.   If your COVID test returns negative and you are either unvaccinated or more than six months out from your two-dose vaccine and are not yet boosted, you should still quarantine for 5 full days followed by strict mask use for an additional 5 full days.   If your COVID test returns negative and you have received your 2-dose initial vaccine as well as a booster, you should continue strict mask use for 10 full days after the exposure.  For all those exposed, best practice includes a test at day 5 after the exposure. This can be a home test or a test through one of the many testing centers throughout our community.   Masking is always advised to limit the spread of COVID. Cdc.gov is an excellent site to obtain the latest up to date recommendations regarding COVID and COVID testing.     CDC Testing and Quarantine Guidelines for patients with exposure to a known-positive COVID-19 person:  A close exposure is defined as anyone who has had an exposure (masked or unmasked) to a known COVID -19 positive person within 6 feet of someone for a cumulative total of 15 minutes or more over a 24-hour period.   Vaccinated and/or if you recently had a positive covid test within 90 days do NOT need to quarantine after contact with someone who had COVID-19 unless you develop symptoms.   Fully vaccinated  people who have not had a positive test within 90 days, should get tested 3-5 days after their exposure, even if they don't have symptoms and wear a mask indoors in public for 14 days following exposure or until their test result is negative.      Unvaccinated and/or NOT had a positive test within 90 days and meet close exposure  You are required by CDC guidelines to quarantine for at least 5 days from time of exposure followed by 5 days of strict masking. It is recommended, but not required to test after 5 days, unless you develop symptoms, in which case you should test at that time.  If you get tested after 5 days and your test is positive, your 5 day period of isolation starts the day of the positive test.    If your exposure does not meet the above definition, you can return to your normal daily activities to include social distancing, wearing a mask and frequent handwashing.      Here is a link to guidance from the CDC:  https://www.cdc.gov/media/releases/2021/s1227-isolation-quarantine-guidance.html      [place] Dept Of Health Testing Sites:  https://ldh.[place].gov/page/3934      [facility] website with testing locations and guidance:  [email or web address]

## 2022-08-26 NOTE — ED PROVIDER NOTES
Encounter Date: 2022       History     Chief Complaint   Patient presents with    Dysuria     Pain with urination x 2 days, denies vag d/c or n/v/fever     CC: Dysuria    HPI:  This is a 47-year-old female with lupus presenting to the ED with 2 day history of dysuria and urinary frequency.  Denies any fever, chills, flank pain, abdominal pain, vaginal bleeding or discharge.  Denies concern for STD.  No recent antibiotic use.  No attempted treatment prior to arrival.    The history is provided by the patient. No  was used.     Review of patient's allergies indicates:  No Known Allergies  Past Medical History:   Diagnosis Date    Lupus      Past Surgical History:   Procedure Laterality Date     SECTION, CLASSIC       History reviewed. No pertinent family history.  Social History     Tobacco Use    Smoking status: Current Every Day Smoker     Packs/day: 0.50     Types: Cigarettes    Smokeless tobacco: Never Used    Tobacco comment: 1 pack lasts 2 days   Substance Use Topics    Alcohol use: Yes     Comment: occass    Drug use: Yes     Types: Marijuana     Review of Systems   Constitutional: Negative for chills and fever.   HENT: Negative for sore throat.    Respiratory: Negative for shortness of breath.    Cardiovascular: Negative for chest pain.   Gastrointestinal: Negative for nausea.   Genitourinary: Positive for dysuria and frequency.   Musculoskeletal: Negative for back pain.   Skin: Negative for rash.   Neurological: Negative for weakness.   Hematological: Does not bruise/bleed easily.       Physical Exam     Initial Vitals [22 0823]   BP Pulse Resp Temp SpO2   111/69 60 18 97.8 °F (36.6 °C) 100 %      MAP       --         Physical Exam    Constitutional: She appears well-developed and well-nourished. She is not diaphoretic. No distress.   HENT:   Head: Normocephalic and atraumatic.   Neck:   Normal range of motion.  Cardiovascular: Normal rate, regular rhythm, normal  heart sounds and intact distal pulses.   Pulmonary/Chest: Breath sounds normal. No respiratory distress.   Abdominal: Abdomen is soft. Bowel sounds are normal. There is no abdominal tenderness.   Musculoskeletal:         General: Normal range of motion.      Cervical back: Normal range of motion.     Neurological: She is alert and oriented to person, place, and time.   Skin: Skin is warm and dry.   Psychiatric: She has a normal mood and affect. Her behavior is normal.         ED Course   Procedures  Labs Reviewed   POCT URINALYSIS W/O SCOPE - Abnormal; Notable for the following components:       Result Value    Blood, UA Trace-intact (*)     Leukocytes, UA 1+ (*)     All other components within normal limits   CULTURE, URINE   POCT URINALYSIS W/O SCOPE   POCT URINE PREGNANCY          Imaging Results    None          Medications   cephALEXin capsule 500 mg (500 mg Oral Given 8/26/22 0858)     Medical Decision Making:   ED Management:  47-year-old female presenting to the ED with dysuria and urinary frequency.  UPT negative.  1+ leukocytes on urinalysis.  Urine culture pending.  Will treat with cephalexin and Pyridium.  No CT findings concerning for pyelonephritis or obstructive uropathy.  Denies concern for STI.  Follow-up with PCP.                      Clinical Impression:   Final diagnoses:  [N30.01] Acute cystitis with hematuria (Primary)          ED Disposition Condition    Discharge Stable        ED Prescriptions     Medication Sig Dispense Start Date End Date Auth. Provider    cephALEXin (KEFLEX) 500 MG capsule Take 1 capsule (500 mg total) by mouth 4 (four) times daily. for 5 days 20 capsule 8/26/2022 8/31/2022 Ingrid Chino NP    phenazopyridine (PYRIDIUM) 200 MG tablet Take 1 tablet (200 mg total) by mouth 3 (three) times daily. for 2 days 6 tablet 8/26/2022 8/28/2022 Ingrid Chino NP        Follow-up Information     Follow up With Specialties Details Why Contact Info    [facility]   Schedule an appointment as soon as possible for a visit in 1 day For follow-up if you do not have a primary care doctor [address] [facility]  [place] [address]  [phone]      [facility] ED Emergency Medicine Go to  If symptoms worsen [address]  [facility] [id number] [phone]           Ingrid Chino NP  08/26/22 0859

## 2022-08-28 LAB — BACTERIA UR CULT: NORMAL

## 2023-07-21 ENCOUNTER — HOSPITAL ENCOUNTER (EMERGENCY)
Facility: HOSPITAL | Age: 48
Discharge: HOME | End: 2023-07-21
Attending: STUDENT IN AN ORGANIZED HEALTH CARE EDUCATION/TRAINING PROGRAM
Payer: MEDICAID

## 2023-07-21 VITALS
OXYGEN SATURATION: 98 % | TEMPERATURE: 98 F | DIASTOLIC BLOOD PRESSURE: 75 MMHG | HEART RATE: 64 BPM | SYSTOLIC BLOOD PRESSURE: 137 MMHG | RESPIRATION RATE: 16 BRPM | HEIGHT: 65 IN | BODY MASS INDEX: 23.32 KG/M2 | WEIGHT: 140 LBS

## 2023-07-21 DIAGNOSIS — M25.539 WRIST PAIN: ICD-10-CM

## 2023-07-21 LAB
B-HCG UR QL: NEGATIVE
CTP QC/QA: YES

## 2023-07-21 PROCEDURE — 99283 EMERGENCY DEPT VISIT LOW MDM: CPT | Mod: 25,ER

## 2023-07-21 PROCEDURE — 25000003 PHARM REV CODE 250: Mod: ER | Performed by: STUDENT IN AN ORGANIZED HEALTH CARE EDUCATION/TRAINING PROGRAM

## 2023-07-21 PROCEDURE — 81025 URINE PREGNANCY TEST: CPT | Mod: ER | Performed by: STUDENT IN AN ORGANIZED HEALTH CARE EDUCATION/TRAINING PROGRAM

## 2023-07-21 PROCEDURE — 81025 URINE PREGNANCY TEST: CPT | Mod: ER

## 2023-07-21 RX ORDER — AMLODIPINE BESYLATE 5 MG/1
1 TABLET ORAL DAILY
COMMUNITY
Start: 2023-05-22

## 2023-07-21 RX ORDER — IBUPROFEN 400 MG/1
400 TABLET ORAL
Status: COMPLETED | OUTPATIENT
Start: 2023-07-21 | End: 2023-07-21

## 2023-07-21 RX ADMIN — IBUPROFEN 400 MG: 400 TABLET ORAL at 09:07

## 2023-07-21 NOTE — ED PROVIDER NOTES
Encounter Date: 2023    SCRIBE #1 NOTE: I, Alberta Edwards, am scribing for, and in the presence of,  Orquidea Wise DO. I have scribed the following portions of the note - Other sections scribed: HPI; ROS; PE.     History     Chief Complaint   Patient presents with    Wrist Pain     C/o right wrist pain. Pt reports pain persistent over a year. Pt has obvious knot noted on wrist, hand currently stiff with limited ROM. Denies injury. Pain 10/10. Denies taking pain meds pta.     Esperanza Simeon is a 48 y.o. female with Hx of HLD, HTN, and Lupus who presents to the ED for chief complaint of painful mass to the medial aspect of the radial bone onset today. Patient reports she's had this mass for over 1 year and it has increased in size. She states it did not become stiff and painful until today rating it a 10/10. Patient notes she soaked her hand in hot water with some relief. She notes a tingling sensation radiating into the forearm intermittently but currently not at this time. Patient reports no injuries or traumas. She denies associated fever, chills, chest pain, shortness of breath, weakness, nausea, vomiting, dysuria, constipation, or abdominal pain.  Patient did not attempt to treat her symptoms prior to ED arrival.  No other complaints at this time. Patient does not have any medical allergies. Patient uses tobacco and marijuana, but does not use EtOH.        The history is provided by the patient. No  was used.   Review of patient's allergies indicates:  No Known Allergies  Past Medical History:   Diagnosis Date    Hypertension     Lupus      Past Surgical History:   Procedure Laterality Date     SECTION, CLASSIC      TUBAL LIGATION      tubes tied     History reviewed. No pertinent family history.  Social History     Tobacco Use    Smoking status: Every Day     Packs/day: 0.50     Types: Cigarettes    Smokeless tobacco: Never    Tobacco comments:     1 pack lasts 2 days    Substance Use Topics    Alcohol use: Yes     Comment: occass    Drug use: Yes     Types: Marijuana     Review of Systems   Constitutional:  Negative for chills and fever.   HENT:  Negative for drooling, facial swelling and trouble swallowing.    Eyes:  Negative for redness and visual disturbance.   Respiratory:  Negative for cough, shortness of breath and stridor.    Cardiovascular:  Negative for chest pain.   Gastrointestinal:  Negative for abdominal pain, constipation, nausea and vomiting.   Genitourinary:  Negative for dysuria and hematuria.   Musculoskeletal:  Positive for arthralgias. Negative for gait problem and neck stiffness.   Skin:  Negative for pallor and wound.   Neurological:  Negative for syncope, facial asymmetry, speech difficulty and weakness.        Positive for tingling.    Psychiatric/Behavioral:  Negative for confusion.    All other systems reviewed and are negative.    Physical Exam     Initial Vitals [07/21/23 0758]   BP Pulse Resp Temp SpO2   (!) 146/79 (!) 59 17 97.9 °F (36.6 °C) 95 %      MAP       --         Physical Exam    Nursing note and vitals reviewed.  Constitutional: Vital signs are normal. She appears well-developed. She is not diaphoretic.  Non-toxic appearance.   HENT:   Head: Normocephalic and atraumatic.   Mouth/Throat: Uvula is midline, oropharynx is clear and moist and mucous membranes are normal.   Eyes: Conjunctivae and EOM are normal. Pupils are equal, round, and reactive to light.   Neck: Neck supple.   Normal range of motion.  Cardiovascular:  Normal rate and regular rhythm.           Abdominal: Abdomen is soft. Bowel sounds are normal. There is no abdominal tenderness. There is no guarding.   Musculoskeletal:      Right wrist: Swelling and tenderness present.      Cervical back: Normal range of motion and neck supple. No rigidity. No spinous process tenderness.      Comments: Mild swelling to the right medial aspect of the distal radial bone. Tenderness with flexion  of the right wrist.      Neurological: She is alert. She has normal strength. No sensory deficit. She displays a negative Romberg sign.       ED Course   Procedures  Labs Reviewed   POCT URINE PREGNANCY          Imaging Results              X-Ray Wrist Complete Right (Final result)  Result time 07/21/23 09:39:52      Final result by Jimmy Peralta MD (07/21/23 09:39:52)                   Impression:      No evidence for acute fracture, bone destruction, or dislocation.    Mild soft tissue swelling.      Electronically signed by: Jimmy Peralta MD  Date:    07/21/2023  Time:    09:39               Narrative:    EXAMINATION:  XR WRIST COMPLETE 3 VIEWS RIGHT    CLINICAL HISTORY:  Pain in unspecified wrist    TECHNIQUE:  PA, lateral, and oblique views of the right wrist were performed.    COMPARISON:  None    FINDINGS:  The bones are intact.  There is no evidence for acute fracture or bone destruction.  There is no evidence for dislocation.  No bony erosions are identified.  There is mild soft tissue swelling at the radial aspect of the wrist.  No radiopaque soft tissue foreign bodies are evident.                                       Medications   ibuprofen tablet 400 mg (400 mg Oral Given 7/21/23 0922)     Medical Decision Making:   History:   Old Medical Records: I decided to obtain old medical records.  Old Records Summarized: records from clinic visits and other records.       <> Summary of Records: External documents reviewed.   Independently Interpreted Test(s):   I have ordered and independently interpreted X-rays - see prior notes.  Clinical Tests:   Lab Tests: Reviewed and Ordered  The following lab test(s) were unremarkable: UPT  Radiological Study: Ordered and Reviewed   MDM  This is an emergent evaluation of a 48 y.o. female with past medical history of lupus on Plaquenil, hypertension, high cholesterol who presents with a 1 year history of right wrist swelling with pain starting today.  Patient also  reports that she noticed a knot in the area for the past year.  However when she woke up this morning she experience stiffness and 10/10 pain to the area.  Denies injury or previous similar pain.  No attempted treatment at home.  Initial vitals in the ED      Vitals [07/21/23 0758]  BP: (!) 146/79  Pulse: (!) 59  Resp: 17  Temp: 97.9 °F (36.6 °C)  SpO2: 95 % .     Physical exam noted above. DDx includes but is not limited to contusion, occult fracture, osteo arthritis, ganglion cyst. Also considered but clinically less likely to be septic joint, vascular injury, dislocation. Will obtain labs and imaging including UPT, x-ray of the right wrist. Will also provide p.o. ibuprofen for pain. Will continue to monitor and frequently reassess pending results of labs, treatments and final disposition.    Patient is aware of plan and is amenable.     Orquidea Wise D.O  EMERGENCY MEDICINE  8:32 AM 07/21/2023    UPDATE:  X-ray of the right wrist reveals mild soft tissue swelling with no evidence of acute fracture, bone destruction or obvious dislocation.  Given benign exam and workup, will discharge with instructions for symptomatic treatment at home, orthopedic follow-up and ED return precautions.  Patient is aware and agreeable to plan.    This chart was completed using dictation software, as a result there may be some transcription errors         Scribe Attestation:   Scribe #1: I performed the above scribed service and the documentation accurately describes the services I performed. I attest to the accuracy of the note.            Scribe attestation: I, Orquidea Wise, DO, personally performed the services described in this documentation.  All medical record entries made by the scribe were at my direction and in my presence.  I have reviewed the chart and agree that the record reflects my personal performance and is accurate and complete.         Clinical Impression:   Final diagnoses:  [M25.539] Wrist  pain        ED Disposition Condition    Discharge Stable          ED Prescriptions    None       Follow-up Information       Follow up With Specialties Details Why Contact Info    [facility] ED Emergency Medicine Go to  If symptoms worsen [address] [facility] [id number] [phone]    [facility] Orthopedics Schedule an appointment as soon as possible for a visit in 1 week Emergency Room Follow-up [facility] [address]  [phone]             Orquidea Wise,   07/21/23 1133

## 2023-07-21 NOTE — Clinical Note
"Esperanza Hull" Cailin was seen and treated in our emergency department on 7/21/2023.  She may return to work on 07/22/2023.       If you have any questions or concerns, please don't hesitate to call.      Saman Pardo RN    "

## 2023-07-21 NOTE — DISCHARGE INSTRUCTIONS
Thank you for coming to our Emergency Department today. It is important to remember that some problems or medical conditions are difficult to diagnose and may not be found during your Emergency Department visit.     Be sure to follow up with your primary care doctor and review all labs/imaging/tests that were performed during your ER visit with them. Some labs/tests may be outside of the normal range and require non-emergent follow-up and further investigation to help diagnose/exclude/prevent complications or other potentially serious medical conditions that were not addressed during your ER visit.    If you do not have a primary care doctor, you may contact the one listed on your discharge paperwork or you may also call the [facility] Appointment Desk at [phone] to schedule an appointment and establish care with one. It is important to your health that you have a primary care doctor.    Please take all medications as directed. All medications may potentially have side-effects and it is impossible to predict which medications may give you side-effects or what side-effects (if any) they will give you.. If you feel that you are having a negative effect or side-effect of any medication you should immediately stop taking them and seek medical attention. If you feel that you are having a life-threatening reaction call 911.    Return to the ER with any questions/concerns, new/concerning symptoms, worsening or failure to improve.     Do not drive, swim, climb to height, take a bath, operate heavy machinery, drink alcohol or take potentially sedating medications, sign any legal documents or make any important decisions for 24 hours if you have received any pain medications, sedatives or mood altering drugs during your ER visit or within 24 hours of taking them if they have been prescribed to you.     You can find additional resources for Dentists, hearing aids, durable medical equipment, low cost pharmacies and  other resources at [email or web address]    BELOW THIS LINE ONLY APPLIES IF YOU HAVE A COVID TEST PENDING OR IF YOU HAVE BEEN DIAGNOSED WITH COVID:  Please access [facility] to review the results of your test. Until the results of your COVID test return, you should isolate yourself so as not to potentially spread illness to others.   If your COVID test returns positive, you should isolate yourself so as not to spread illness to others. After five full days, if you are feeling better and you have not had fever for 24 hours, you can return to your typical daily activities, but you must wear a mask around others for an additional 5 days.   If your COVID test returns negative and you are either unvaccinated or more than six months out from your two-dose vaccine and are not yet boosted, you should still quarantine for 5 full days followed by strict mask use for an additional 5 full days.   If your COVID test returns negative and you have received your 2-dose initial vaccine as well as a booster, you should continue strict mask use for 10 full days after the exposure.  For all those exposed, best practice includes a test at day 5 after the exposure. This can be a home test or a test through one of the many testing centers throughout our community.   Masking is always advised to limit the spread of COVID. Cdc.gov is an excellent site to obtain the latest up to date recommendations regarding COVID and COVID testing.     CDC Testing and Quarantine Guidelines for patients with exposure to a known-positive COVID-19 person:  A close exposure is defined as anyone who has had an exposure (masked or unmasked) to a known COVID -19 positive person within 6 feet of someone for a cumulative total of 15 minutes or more over a 24-hour period.   Vaccinated and/or if you recently had a positive covid test within 90 days do NOT need to quarantine after contact with someone who had COVID-19 unless you develop symptoms.   Fully vaccinated  people who have not had a positive test within 90 days, should get tested 3-5 days after their exposure, even if they don't have symptoms and wear a mask indoors in public for 14 days following exposure or until their test result is negative.      Unvaccinated and/or NOT had a positive test within 90 days and meet close exposure  You are required by CDC guidelines to quarantine for at least 5 days from time of exposure followed by 5 days of strict masking. It is recommended, but not required to test after 5 days, unless you develop symptoms, in which case you should test at that time.  If you get tested after 5 days and your test is positive, your 5 day period of isolation starts the day of the positive test.    If your exposure does not meet the above definition, you can return to your normal daily activities to include social distancing, wearing a mask and frequent handwashing.      Here is a link to guidance from the CDC:  https://www.cdc.gov/media/releases/2021/s1227-isolation-quarantine-guidance.html      [place] Dept Of Health Testing Sites:  https://ldh.[place].gov/page/3934      [facility] website with testing locations and guidance:  [email or web address]

## 2024-04-20 ENCOUNTER — HOSPITAL ENCOUNTER (EMERGENCY)
Facility: HOSPITAL | Age: 49
Discharge: HOME | End: 2024-04-20
Attending: STUDENT IN AN ORGANIZED HEALTH CARE EDUCATION/TRAINING PROGRAM
Payer: MEDICAID

## 2024-04-20 VITALS
DIASTOLIC BLOOD PRESSURE: 59 MMHG | TEMPERATURE: 98 F | RESPIRATION RATE: 18 BRPM | HEART RATE: 55 BPM | WEIGHT: 144 LBS | SYSTOLIC BLOOD PRESSURE: 115 MMHG | BODY MASS INDEX: 23.96 KG/M2 | OXYGEN SATURATION: 100 %

## 2024-04-20 DIAGNOSIS — K04.7 DENTAL ABSCESS: Primary | ICD-10-CM

## 2024-04-20 DIAGNOSIS — K02.9 DENTAL CARIES: ICD-10-CM

## 2024-04-20 DIAGNOSIS — K04.7 DENTAL INFECTION: ICD-10-CM

## 2024-04-20 PROCEDURE — 41800 DRAINAGE OF GUM LESION: CPT

## 2024-04-20 PROCEDURE — 99284 EMERGENCY DEPT VISIT MOD MDM: CPT | Mod: 25

## 2024-04-20 RX ORDER — BUPIVACAINE HCL/EPINEPHRINE 0.5-1:200K
3.6 VIAL (ML) INJECTION
Status: DISCONTINUED | OUTPATIENT
Start: 2024-04-20 | End: 2024-04-20

## 2024-04-20 RX ORDER — AMOXICILLIN AND CLAVULANATE POTASSIUM 875; 125 MG/1; MG/1
1 TABLET, FILM COATED ORAL 2 TIMES DAILY
Qty: 28 TABLET | Refills: 0 | Status: SHIPPED | OUTPATIENT
Start: 2024-04-20 | End: 2024-04-20

## 2024-04-20 RX ORDER — CHLORHEXIDINE GLUCONATE ORAL RINSE 1.2 MG/ML
15 SOLUTION DENTAL 2 TIMES DAILY
Qty: 420 ML | Refills: 0 | Status: SHIPPED | OUTPATIENT
Start: 2024-04-20 | End: 2024-05-04

## 2024-04-20 RX ORDER — ACETAMINOPHEN 500 MG
500 TABLET ORAL EVERY 6 HOURS PRN
Qty: 30 TABLET | Refills: 0 | Status: SHIPPED | OUTPATIENT
Start: 2024-04-20 | End: 2024-04-20

## 2024-04-20 RX ORDER — ACETAMINOPHEN 500 MG
500 TABLET ORAL EVERY 6 HOURS PRN
Qty: 30 TABLET | Refills: 0 | Status: SHIPPED | OUTPATIENT
Start: 2024-04-20

## 2024-04-20 RX ORDER — CHLORHEXIDINE GLUCONATE ORAL RINSE 1.2 MG/ML
15 SOLUTION DENTAL 2 TIMES DAILY
Qty: 420 ML | Refills: 0 | Status: SHIPPED | OUTPATIENT
Start: 2024-04-20 | End: 2024-04-20

## 2024-04-20 RX ORDER — ATORVASTATIN CALCIUM 20 MG/1
1 TABLET, FILM COATED ORAL DAILY
COMMUNITY
Start: 2023-05-22

## 2024-04-20 RX ORDER — NAPROXEN 500 MG/1
500 TABLET ORAL 2 TIMES DAILY
Qty: 30 TABLET | Refills: 0 | Status: SHIPPED | OUTPATIENT
Start: 2024-04-20

## 2024-04-20 RX ORDER — AMOXICILLIN AND CLAVULANATE POTASSIUM 875; 125 MG/1; MG/1
1 TABLET, FILM COATED ORAL 2 TIMES DAILY
Qty: 28 TABLET | Refills: 0 | Status: SHIPPED | OUTPATIENT
Start: 2024-04-20 | End: 2024-05-04

## 2024-04-20 RX ORDER — NAPROXEN 500 MG/1
500 TABLET ORAL 2 TIMES DAILY
Qty: 30 TABLET | Refills: 0 | Status: SHIPPED | OUTPATIENT
Start: 2024-04-20 | End: 2024-04-20

## 2024-04-20 NOTE — DISCHARGE INSTRUCTIONS
Please follow up with a dentist as soon as possible for reevaluation of symptoms, if you do not have one you can follow up with one of the dental clinics from the list that will given to you with your discharge instructions.    Please take all your antibiotics as prescribed even if your are feeling better or your symptoms resolve.    Please return to the ER for any worsening symptoms including: fever, facial swelling/redness, difficulty opening your mouth/breathing/swallowing or new symptoms or other concerns.  Please follow up with a dentist as soon as possible for reevaluation of symptoms, if you do not have one you can follow up with one of the dental clinics from the list that will given to you with your discharge instructions.    Please take all your antibiotics as prescribed even if your are feeling better or your symptoms resolve.    Please return to the ER for any worsening symptoms including: fever, facial swelling/redness, difficulty opening your mouth/breathing/swallowing or new symptoms or other concerns.    Local Dental Clinics. Please call or visit the Medicaid Dental Website Below:   [facility]:  [address], [address]; [place] [address] [phone] - Accepts Medicaid    [facility]  [address] [place] [address] [phone] - Accepts Medicaid    [facility] [place] -  [address], [place] [address] [phone]    [facility]:  Monday - Friday 8:00 AM - 4:30PM  [address] [place] [address] [phone]    Patients with Medicaid Dental Coverage:  You can go to [email or web address]  To find a list of dentists in the area that accept Medicaid. Please call the numbers to schedule an appointment.   Thank you for coming to our Emergency Department today. It is important to remember that some problems or medical conditions are difficult to diagnose and may not be found or addressed during your  Emergency Department visit.  These conditions often start with non-specific symptoms and can only be diagnosed on follow up visits with your primary care physician or specialist when the symptoms continue or change. Please remember that all medical conditions can change, and we cannot predict how you will be feeling tomorrow or the next day. Return to the ER with any questions/concerns, new/concerning symptoms, worsening or failure to improve. Also, please follow up with your Primary Care Physician and/or Pediatrician in the next 1-2 days to review your ED visit in entirety and for re-evaluation.   Be sure to follow up with your primary care doctor and review all labs/imaging/tests that were performed during your ER visit with them. It is very common for us to identify non-emergent incidental findings which must be followed up with your primary care physician.  Some labs/imaging/tests may be outside of the normal range, and require non-emergent follow-up and/or further investigation/treatment/procedures/testing to help diagnose/exclude/prevent complications or other potentially serious medical conditions. Some abnormalities may not have been discussed or addressed during your ER visit. Some lab results may not return during your ER visit but can be accessible by downloading the free [facility] or by visiting [email or web address] . It is important for you to review all labs/imaging/tests which are outside of the normal range with your physician.  An ER visit does not replace a primary care visit, and many screening tests or follow-up tests cannot be ordered by an ER doctor or performed by the ER. Some tests may even require pre-approval.  If you do not have a primary care doctor, you may contact the one listed on your discharge paperwork or you may also call the [facility] Appointment Desk at [phone] , or [address] [place] [address] at  [phone] to schedule an appointment, or establish care with a primary  care doctor or even a specialist and to obtain information about local resources. It is important to your health that you have a primary care doctor.  Please take all medications as directed. We have done our best to select a medication for you that will treat your condition however, all medications may potentially have side-effects and it is impossible to predict which medications may give you side-effects or what those side-effects (if any) those medications may give you.  If you feel that you are having a negative effect or side-effect of any medication you should stop taking those medications immediately and seek medical attention. If you feel that you are having a life-threatening reaction call 911.  Do not drive, swim, climb to height, take a bath, operate heavy machinery, drink alcohol or take potentially sedating medications, sign any legal documents or make any important decisions for 24 hours if you have received any pain medications, sedatives or mood altering drugs during your ER visit or within 24 hours of taking them if they have been prescribed to you.   You can find additional resources for Dentists, hearing aids, durable medical equipment, low cost pharmacies and other resources at [email or web address]  Patient agrees with this plan. Discussed with her strict return precautions, they verbalized understanding. Patient is stable for discharge.

## 2024-04-20 NOTE — ED TRIAGE NOTES
Pt reports she has dental appointments scheduled to remove her teeth and eventually get dentures.  She noticed pain and swelling to her lower right teeth 2 days ago.  Today the swelling and pain is worse, she thinks it's an abscess. Hx lupus, htn, hld.

## 2024-04-20 NOTE — ED PROVIDER NOTES
Encounter Date: 2024       History     Chief Complaint   Patient presents with    Oral Swelling     Pt to ED reporting abscess x Thursday. Abscess is inside mouth to the bottom right. Pt reporting she needs to get all of her teeth removed.      HPI    49-year-old female with past medical history of hypertension and lupus and multiple dental caries presenting to the emergency department today with a complaint of abscess to the inside of her mouth times 2 days.  Patient states that she has a lot of dental problems needs to have her teeth removed.  Patient notes that she was a an appointment with a dentist next week.  States she could not see the dentist today and thinks she needs antibiotics.  No medication taken prior to arrival.  Denies any fever, chest pain, shortness for breath, nausea, vomiting, headache, dizziness, weakness, difficulty swallowing.    Review of patient's allergies indicates:  No Known Allergies  Past Medical History:   Diagnosis Date    Hypertension     Lupus      Past Surgical History:   Procedure Laterality Date     SECTION, CLASSIC      DENTAL SURGERY      TUBAL LIGATION      tubes tied     No family history on file.  Social History     Tobacco Use    Smoking status: Every Day     Current packs/day: 0.50     Types: Cigarettes    Smokeless tobacco: Never    Tobacco comments:     1 pack lasts 2 days   Substance Use Topics    Alcohol use: Yes     Comment: occass    Drug use: Yes     Types: Marijuana     Review of Systems   Constitutional:  Negative for chills, diaphoresis, fatigue and fever.   HENT:  Positive for dental problem (tooth pain). Negative for congestion, rhinorrhea, sore throat, trouble swallowing and voice change.    Eyes:  Negative for redness and visual disturbance.   Respiratory:  Negative for cough and shortness of breath.    Cardiovascular:  Negative for chest pain, palpitations and leg swelling.   Gastrointestinal:  Negative for abdominal pain, diarrhea, nausea and  vomiting.   Genitourinary:  Negative for difficulty urinating, dysuria, flank pain and frequency.   Musculoskeletal:  Negative for arthralgias, back pain, myalgias, neck pain and neck stiffness.   Skin:  Negative for rash.   Neurological:  Negative for dizziness, weakness, light-headedness and headaches.   Hematological:  Does not bruise/bleed easily.       Physical Exam     Initial Vitals [04/20/24 1709]   BP Pulse Resp Temp SpO2   138/71 (!) 57 18 98.3 °F (36.8 °C) 100 %      MAP       --         Physical Exam    Nursing note and vitals reviewed.  Constitutional: She appears well-developed and well-nourished. She is not diaphoretic. No distress.   HENT:   Head: Normocephalic and atraumatic.   Right Ear: Tympanic membrane, external ear and ear canal normal. No mastoid tenderness.   Left Ear: Tympanic membrane, external ear and ear canal normal. No mastoid tenderness.   Nose: Nose normal.   Mouth/Throat: Uvula is midline, oropharynx is clear and moist and mucous membranes are normal. No oral lesions. No trismus in the jaw. Abnormal dentition. Dental abscesses and dental caries present. No uvula swelling.   Dental abscess on gingiva around tooth 27-28. No gingival friability, no facial erythema or swelling concerning for facial cellulitis.   Uvula midline without any erythema or swelling.  No submandibular or sublingual swelling or erythema.  No trismus.  Normal jaw occlusion.  No evidence of tonsillar abscess.  No muffled voice.    Patient is tolerating secretions without difficulty.   Patient is speaking in full sentences on exam without difficulty.  There is no postauricular swelling, or overlying erythema or tenderness to palpation over mastoids bilaterally.   Eyes: Conjunctivae and EOM are normal. Pupils are equal, round, and reactive to light. Right eye exhibits no discharge. Left eye exhibits no discharge.   Neck: Neck supple.   Normal range of motion.   Full passive range of motion without pain.      Cardiovascular:  Normal rate, regular rhythm, normal heart sounds and normal pulses.     Exam reveals no distant heart sounds and no friction rub.       Pulmonary/Chest: Effort normal and breath sounds normal. No respiratory distress.   Abdominal: Abdomen is soft. Bowel sounds are normal. She exhibits no distension, no pulsatile midline mass and no mass. There is no splenomegaly or hepatomegaly. There is no abdominal tenderness.   No right CVA tenderness.  No left CVA tenderness. There is no rebound and no guarding.   Musculoskeletal:         General: Normal range of motion.      Cervical back: Normal, full passive range of motion without pain, normal range of motion and neck supple.      Thoracic back: Normal.      Lumbar back: Normal.      Right lower leg: Normal.      Left lower leg: Normal.     Neurological: She is alert and oriented to person, place, and time. She has normal strength. No cranial nerve deficit or sensory deficit. Gait normal.   Skin: Skin is warm and dry. Capillary refill takes less than 2 seconds. No bruising, no ecchymosis and no rash noted. No pallor.   Psychiatric: She has a normal mood and affect. Her speech is normal and behavior is normal. Thought content normal.         ED Course   I & D - Incision and Drainage    Date/Time: 4/20/2024 6:07 PM  Location procedure was performed: [facility] EMERGENCY DEPARTMENT    Performed by: José Manuel Villalobos PA-C  Authorized by: Orquidea Wise DO  Pre-operative diagnosis: abscess  Post-operative diagnosis: abscess  Type: abscess  Body area: mouth  Location details: alveolar process  Anesthesia: local infiltration    Anesthesia:  Local Anesthetic: topical anesthetic  Scalpel size: 11  Incision type: single straight  Complexity: simple  Drainage: pus  Drainage amount: moderate  Wound treatment: incision, drainage, expression of material and wound left open  Packing material: 1/4 in gauze  Complications: No  Specimens: No  Implants: No  Patient  tolerance: Patient tolerated the procedure well with no immediate complications  Comments: Mouth cleaned with chloraseptic wash, constantin-minda used for local anesthesia, small incision made and moderate amount of pus extracted with assistance of oral suction.  Patient tolerated procedure well without acute complications.  Patient will be discharged home with a 14 day course of Augmentin and Chloraseptic mouthwash.  Instructed to follow up with dentist and primary care physician in 1-2 days.        Labs Reviewed - No data to display       Imaging Results    None          Medications - No data to display    Medical Decision Making    49-year-old female with past medical history of hypertension and lupus and multiple dental caries presenting to the emergency department today with a complaint of abscess to the inside of her mouth times 2 days.  Patient states that she has a lot of dental problems needs to have her teeth removed.  Patient notes that she was a an appointment with a dentist next week.  States she could not see the dentist today and thinks she needs antibiotics.  No medication taken prior to arrival.  Denies any fever, chest pain, shortness for breath, nausea, vomiting, headache, dizziness, weakness, difficulty swallowing.    Patient's chart and medical history reviewed.  Patient's vitals reviewed.  They are afebrile, no respiratory distress, nontoxic-appearing in the ED.  Differential diagnosis considered Gingivitis, Dental abscess, Fractured tooth, Facial cellulitis, Pulpitis, Peritonsillar abscess, Retropharyngeal abscess, Caleb's angina , Dry Socket, Trench mouth.   Dental abscess incision and drainage as noted above in procedure note.  Patient tolerated procedure well.    At this time I'll discharge home to follow up with primary care physician in the next 2-3 days for further evaluation.  If the pain continues the pt will need to see Dentist for further evaluation.  The patient is comfortable with this  plan and comfortable going home at this time. After taking into careful account the historical factors and physical exam findings of the patient's presentation today, in conjunction with the empirical and objective data obtained on ED workup, no acute emergent medical condition has been identified. The patient appears to be low risk for an emergent medical condition and I feel it is safe and appropriate at this time for the patient to be discharged to follow-up as detailed in their discharge instructions for reevaluation and possible continued outpatient workup and management. I have discussed the specifics of the workup with the patient and the patient has verbalized understanding of the details of the workup, the diagnosis, the treatment plan, and the need for outpatient follow-up.  Although the patient has no emergent etiology today this does not preclude the development of an emergent condition so in addition, I have advised the patient that they can return to the ED and/or activate EMS at any time with worsening of their symptoms, change of their symptoms, or with any other medical complaint.  The patient remained comfortable and stable during their visit in the ED.  Discharge and follow-up instructions discussed with the patient who expressed understanding and willingness to comply with my recommendations.  I discussed with the patient/family the diagnosis, treatment plan, indications for return to the emergency department, and for expected follow-up. Please follow up with your primary doctor in 1-2 days and return to the ED in any new, worsening, or continued symptoms. The patient/family verbalized an understanding. The patient/family is asked if there are any questions or concerns. We discuss the case, until all issues are addressed to the patient/family's satisfaction. Patient/family understands and is agreeable to the plan.   ALBA BLAS    DISCLAIMER: This note was prepared with [employer] voice  recognition transcription software. Garbled syntax, mangled pronouns, and other bizarre constructions may be attributed to that software system.        Amount and/or Complexity of Data Reviewed  External Data Reviewed: notes.    Risk  Prescription drug management.                                      Clinical Impression:  Final diagnoses:  [K04.7] Dental abscess (Primary)  [K04.7] Dental infection  [K02.9] Dental caries          ED Disposition Condition    Discharge Stable          ED Prescriptions       Medication Sig Dispense Start Date End Date Auth. Provider    amoxicillin-clavulanate 875-125mg (AUGMENTIN) 875-125 mg per tablet  (Status: Discontinued) Take 1 tablet by mouth 2 (two) times daily. for 14 days 28 tablet 4/20/2024 4/20/2024 Flexer, José Manuel D, PA-C    chlorhexidine (PERIDEX) 0.12 % solution  (Status: Discontinued) Use as directed 15 mLs in the mouth or throat 2 (two) times daily. for 14 days 420 mL 4/20/2024 4/20/2024 Flexer, José Manuel D, PA-C    naproxen (NAPROSYN) 500 MG tablet  (Status: Discontinued) Take 1 tablet (500 mg total) by mouth 2 (two) times daily. 30 tablet 4/20/2024 4/20/2024 Flexer, José Manuel D, PA-C    acetaminophen (TYLENOL) 500 MG tablet  (Status: Discontinued) Take 1 tablet (500 mg total) by mouth every 6 (six) hours as needed for Pain. 30 tablet 4/20/2024 4/20/2024 Flexer, José Manuel D, PA-C    acetaminophen (TYLENOL) 500 MG tablet Take 1 tablet (500 mg total) by mouth every 6 (six) hours as needed for Pain. 30 tablet 4/20/2024 -- Flexer, José Manuel D, PA-C    amoxicillin-clavulanate 875-125mg (AUGMENTIN) 875-125 mg per tablet Take 1 tablet by mouth 2 (two) times daily. for 14 days 28 tablet 4/20/2024 5/4/2024 Flexer, José Manuel D, PA-C    chlorhexidine (PERIDEX) 0.12 % solution Use as directed 15 mLs in the mouth or throat 2 (two) times daily. for 14 days 420 mL 4/20/2024 5/4/2024 José Manuel Villalobos PA-C    naproxen (NAPROSYN) 500 MG tablet Take 1 tablet (500 mg total) by mouth 2 (two) times daily. 30  tablet 4/20/2024 -- José Manuel Villalobos PA-C          Follow-up Information       Follow up With Specialties Details Why Contact Info    [facility] -  Schedule an appointment as soon as possible for a visit in 1 day for follow up [address]  [place] [address]  [phone]               José Manuel Villalobos PA-C  04/20/24 1233

## 2024-10-14 ENCOUNTER — PATIENT MESSAGE (OUTPATIENT)
Dept: GASTROENTEROLOGY | Facility: CLINIC | Age: 49
End: 2024-10-14
Payer: MEDICAID

## 2025-01-31 ENCOUNTER — HOSPITAL ENCOUNTER (EMERGENCY)
Facility: HOSPITAL | Age: 50
Discharge: HOME | End: 2025-01-31
Attending: EMERGENCY MEDICINE
Payer: MEDICAID

## 2025-01-31 VITALS
WEIGHT: 141 LBS | SYSTOLIC BLOOD PRESSURE: 147 MMHG | RESPIRATION RATE: 18 BRPM | BODY MASS INDEX: 23.49 KG/M2 | HEIGHT: 65 IN | HEART RATE: 70 BPM | TEMPERATURE: 98 F | DIASTOLIC BLOOD PRESSURE: 82 MMHG | OXYGEN SATURATION: 100 %

## 2025-01-31 DIAGNOSIS — R05.2 SUBACUTE COUGH: ICD-10-CM

## 2025-01-31 DIAGNOSIS — M67.40 GANGLION CYST: Primary | ICD-10-CM

## 2025-01-31 PROCEDURE — 99283 EMERGENCY DEPT VISIT LOW MDM: CPT | Mod: ER

## 2025-01-31 RX ORDER — PREDNISONE 20 MG/1
40 TABLET ORAL DAILY
Qty: 10 TABLET | Refills: 0 | Status: SHIPPED | OUTPATIENT
Start: 2025-01-31 | End: 2025-02-05

## 2025-01-31 NOTE — ED PROVIDER NOTES
Encounter Date: 2025       History     Chief Complaint   Patient presents with    Cough     Dry cough x 9 days.     Patient presents to the emergency department with complaints of a cough.  Patient notes that proximally 1 week ago during a snow storm here in [place] the patient developed an upper respiratory infection with cough fever sore throat congestion.  She did not follow up with the doctor but presents here to the emergency department today with concerns of continued cough remainder of her symptoms have completely resolved.  No fever reported no other complaints of at the present time.    The history is provided by the patient.     Review of patient's allergies indicates:  No Known Allergies  Past Medical History:   Diagnosis Date    Hypertension     Lupus      Past Surgical History:   Procedure Laterality Date     SECTION, CLASSIC      DENTAL SURGERY      TUBAL LIGATION      tubes tied     No family history on file.  Social History     Tobacco Use    Smoking status: Every Day     Current packs/day: 0.50     Types: Cigarettes    Smokeless tobacco: Never    Tobacco comments:     1 pack lasts 2 days   Substance Use Topics    Alcohol use: Yes     Comment: occass    Drug use: Yes     Types: Marijuana     Review of Systems   Constitutional: Negative.    HENT: Negative.  Negative for sore throat.    Eyes: Negative.    Respiratory:  Positive for cough. Negative for shortness of breath.    Cardiovascular: Negative.    Gastrointestinal: Negative.    Endocrine: Negative.    Genitourinary: Negative.    Musculoskeletal: Negative.    Skin: Negative.    Allergic/Immunologic: Negative.    Neurological: Negative.    Hematological: Negative.    Psychiatric/Behavioral: Negative.     All other systems reviewed and are negative.      Physical Exam     Initial Vitals [25 1145]   BP Pulse Resp Temp SpO2   (!) 147/82 70 18 98.4 °F (36.9 °C) 100 %      MAP       --         Physical Exam    Nursing note and  vitals reviewed.  Constitutional: Vital signs are normal. She appears well-developed. She is active and cooperative.   HENT:   Head: Normocephalic and atraumatic.   Right Ear: Tympanic membrane normal.   Left Ear: Tympanic membrane normal.   Nose: Nose normal. Mouth/Throat: Uvula is midline, oropharynx is clear and moist and mucous membranes are normal.   Eyes: Conjunctivae, EOM and lids are normal. Pupils are equal, round, and reactive to light.   Neck: Trachea normal and phonation normal. Neck supple. No thyroid mass present. No stridor present. No tracheal tenderness present. No tracheal deviation present.   Normal range of motion.   Full passive range of motion without pain.     Cardiovascular:  Normal rate, regular rhythm, S1 normal, S2 normal, normal heart sounds, intact distal pulses and normal pulses.           Pulmonary/Chest: Effort normal and breath sounds normal.   Abdominal: Abdomen is soft. Bowel sounds are normal. There is no abdominal tenderness.   Musculoskeletal:         General: Normal range of motion.      Cervical back: Full passive range of motion without pain, normal range of motion and neck supple. No edema, erythema or rigidity. No spinous process tenderness or muscular tenderness. Normal range of motion.     Lymphadenopathy:     She has no axillary adenopathy.   Neurological: She is alert and oriented to person, place, and time.   Skin: Skin is warm, dry and intact.   Psychiatric: She has a normal mood and affect. Her speech is normal and behavior is normal. Judgment and thought content normal. Cognition and memory are normal.         ED Course   Procedures  Labs Reviewed - No data to display       Imaging Results    None          Medications - No data to display  Medical Decision Making  Patient with complaints of an isolated cough with normal findings on physical examination x-ray is not indicated as patient lung fields are completely clear.  There is no evidence to suggest pneumonia any  active pulmonary problem.  Patient be discharged on steroids with instructions to follow up with the primary health care provider.  Additionally this patient asked for a referral to Orthopedics as the patient has a ganglion cyst on her left arm.  Changes or acuity to this ganglion cyst patient just wants follow up.                                      Clinical Impression:  Final diagnoses:  [R05.2] Subacute cough  [M67.40] Ganglion cyst (Primary)          ED Disposition Condition    Discharge Stable          ED Prescriptions       Medication Sig Dispense Start Date End Date Auth. Provider    predniSONE (DELTASONE) 20 MG tablet Take 2 tablets (40 mg total) by mouth once daily. for 5 days 10 tablet 1/31/2025 2/5/2025 Hugo Arzate MD          Follow-up Information       Follow up With Specialties Details Why Contact Info    Your PCP  Schedule an appointment as soon as possible for a visit in 1 week               Hugo Arzate MD  01/31/25 9623

## 2025-02-18 ENCOUNTER — HOSPITAL ENCOUNTER (EMERGENCY)
Facility: HOSPITAL | Age: 50
Discharge: HOME | End: 2025-02-18
Attending: EMERGENCY MEDICINE
Payer: MEDICAID

## 2025-02-18 VITALS
RESPIRATION RATE: 17 BRPM | OXYGEN SATURATION: 100 % | TEMPERATURE: 98 F | HEIGHT: 65 IN | SYSTOLIC BLOOD PRESSURE: 112 MMHG | WEIGHT: 141 LBS | BODY MASS INDEX: 23.49 KG/M2 | DIASTOLIC BLOOD PRESSURE: 63 MMHG | HEART RATE: 56 BPM

## 2025-02-18 DIAGNOSIS — N39.0 URINARY TRACT INFECTION WITHOUT HEMATURIA, SITE UNSPECIFIED: Primary | ICD-10-CM

## 2025-02-18 LAB
BILIRUB UR QL STRIP: NEGATIVE
CLARITY UR: ABNORMAL
COLOR UR: YELLOW
GLUCOSE UR QL STRIP: NEGATIVE
HGB UR QL STRIP: NEGATIVE
KETONES UR QL STRIP: NEGATIVE
LEUKOCYTE ESTERASE UR QL STRIP: ABNORMAL
MICROSCOPIC COMMENT: ABNORMAL
NITRITE UR QL STRIP: NEGATIVE
NON-SQ EPI CELLS #/AREA URNS HPF: 2 /HPF
PH UR STRIP: 8 [PH] (ref 5–8)
PROT UR QL STRIP: ABNORMAL
SP GR UR STRIP: 1.02 (ref 1–1.03)
SQUAMOUS #/AREA URNS HPF: 2 /HPF
URN SPEC COLLECT METH UR: ABNORMAL
UROBILINOGEN UR STRIP-ACNC: ABNORMAL EU/DL
WBC #/AREA URNS HPF: 78 /HPF (ref 0–5)

## 2025-02-18 PROCEDURE — 81000 URINALYSIS NONAUTO W/SCOPE: CPT | Performed by: EMERGENCY MEDICINE

## 2025-02-18 PROCEDURE — 87086 URINE CULTURE/COLONY COUNT: CPT | Performed by: EMERGENCY MEDICINE

## 2025-02-18 PROCEDURE — 99283 EMERGENCY DEPT VISIT LOW MDM: CPT

## 2025-02-18 RX ORDER — NITROFURANTOIN 25; 75 MG/1; MG/1
100 CAPSULE ORAL 2 TIMES DAILY
Qty: 10 CAPSULE | Refills: 0 | Status: SHIPPED | OUTPATIENT
Start: 2025-02-18 | End: 2025-02-23

## 2025-02-18 NOTE — Clinical Note
"Esperanza Lowea" Cailin was seen and treated in our emergency department on 2/18/2025.  She may return to work on 02/19/2025.       If you have any questions or concerns, please don't hesitate to call.      Radha Osei PA-C"

## 2025-02-18 NOTE — DISCHARGE INSTRUCTIONS
Take medication as prescribed.  Return to the ED for any changing or concerning symptoms.  Follow up with your doctor.

## 2025-02-18 NOTE — ED TRIAGE NOTES
Pt c/o dysuria, frequency and foul smelling urine starting yesterday. Pt also reports lower pelvic pain. Denies any vaginal discharge. Pt reports last used marijuana today. No distress noted

## 2025-02-18 NOTE — ED PROVIDER NOTES
Encounter Date: 2025    SCRIBE #1 NOTE: I, Molly Eubanks, am scribing for, and in the presence of,  Radha Osei PA-C. I have scribed the following portions of the note - Other sections scribed: HPI, ROS.       History     Chief Complaint   Patient presents with    Urinary Frequency     Frequency, odor, lower abd discomfort x2 days     CC: Urinary Frequency    HPI: This is a 50 y.o. female with PMHx of hypertension and lupus who presents to the ED complaining of increased urinary frequency x 2 days. Patient reports having to urinating every 15-20 minutes. She also reports associated dysuria, malodorous urine, and pelvic pain/pressure. No attempted treatment PTA. No other exacerbating or alleviating factors. Denies any fever, chills, back pain, vaginal discharge, or other associated symptoms. NKDA.     The history is provided by the patient. No  was used.     Review of patient's allergies indicates:  No Known Allergies  Past Medical History:   Diagnosis Date    Hypertension     Lupus      Past Surgical History:   Procedure Laterality Date     SECTION, CLASSIC      DENTAL SURGERY      TUBAL LIGATION      tubes tied     No family history on file.  Social History[1]  Review of Systems   Constitutional:  Negative for chills and fever.   HENT:  Negative for sore throat.    Eyes:  Negative for visual disturbance.   Respiratory:  Negative for shortness of breath.    Cardiovascular:  Negative for chest pain.   Gastrointestinal:  Negative for abdominal pain, diarrhea, nausea and vomiting.   Genitourinary:  Positive for dysuria, frequency and pelvic pain. Negative for difficulty urinating and vaginal discharge.        (+) Malodorous urine    Musculoskeletal:  Negative for back pain.   Skin:  Negative for rash.   Neurological:  Negative for headaches.   All other systems reviewed and are negative.      Physical Exam     Initial Vitals   BP Pulse Resp Temp SpO2   25 1135 25  1133 02/18/25 1133 02/18/25 1133 02/18/25 1133   106/61 63 18 98.2 °F (36.8 °C) 100 %      MAP       --                Physical Exam    Nursing note and vitals reviewed.  Constitutional: She appears well-developed and well-nourished. She is not diaphoretic. No distress.   HENT:   Head: Normocephalic and atraumatic.   Nose: Nose normal.   Eyes: EOM are normal. Pupils are equal, round, and reactive to light.   Neck: Neck supple.   Normal range of motion.  Cardiovascular:  Normal rate and regular rhythm.           No murmur heard.  Pulmonary/Chest: Breath sounds normal. No respiratory distress. She has no wheezes. She has no rhonchi. She has no rales.   No CVA tenderness   Abdominal: Abdomen is soft. Bowel sounds are normal. She exhibits no distension. There is no abdominal tenderness. There is no rebound and no guarding.   Musculoskeletal:         General: No tenderness or edema. Normal range of motion.      Cervical back: Normal range of motion and neck supple.     Neurological: She is alert and oriented to person, place, and time. No cranial nerve deficit.   Skin: Skin is warm. No rash noted. No erythema.         ED Course   Procedures  Labs Reviewed   URINALYSIS, REFLEX TO URINE CULTURE - Abnormal       Result Value    Specimen UA Urine, Clean Catch      Color, UA Yellow      Appearance, UA Hazy (*)     pH, UA 8.0      Specific Gravity, UA 1.025      Protein, UA Trace (*)     Glucose, UA Negative      Ketones, UA Negative      Bilirubin (UA) Negative      Occult Blood UA Negative      Nitrite, UA Negative      Urobilinogen, UA 2.0-3.0 (*)     Leukocytes, UA 2+ (*)     Narrative:     Specimen Source->Urine   URINALYSIS MICROSCOPIC - Abnormal    WBC, UA 78 (*)     Squam Epithel, UA 2      Non-Squam Epith 2 (*)     Microscopic Comment SEE COMMENT      Narrative:     Specimen Source->Urine   CULTURE, URINE          Imaging Results    None          Medications - No data to display  Medical Decision Making  Amount and/or  Complexity of Data Reviewed  Labs: ordered. Decision-making details documented in ED Course.    Risk  Prescription drug management.         APC / Resident Notes:   This is an urgent evaluation of a 50-year-old female presents to the emergency department complaining of urinary frequency.  While in the emergency department urinalysis was performed which revealed evidence of urinary tract infection.  Antibiotics prescribed.  Return precautions reviewed.  She verbalized understanding and agreement and was discharged in stable condition.     Scribe Attestation:   Scribe #1: I performed the above scribed service and the documentation accurately describes the services I performed. I attest to the accuracy of the note.                             I, Radha Osei PA-C, personally performed the services described in this documentation. All medical record entries made by the scribe were at my direction and in my presence. I have reviewed the chart and agree that the record reflects my personal performance and is accurate and complete.      DISCLAIMER: This note was prepared with [employer] voice recognition transcription software. Garbled syntax, mangled pronouns, and other bizarre constructions may be attributed to that software system.    Clinical Impression:  Final diagnoses:  [N39.0] Urinary tract infection without hematuria, site unspecified (Primary)          ED Disposition Condition    Discharge Stable          ED Prescriptions       Medication Sig Dispense Start Date End Date Auth. Provider    nitrofurantoin, macrocrystal-monohydrate, (MACROBID) 100 MG capsule Take 1 capsule (100 mg total) by mouth 2 (two) times daily. for 5 days 10 capsule 2/18/2025 2/23/2025 Radah Osei PA-C          Follow-up Information    None              [1]   Social History  Tobacco Use    Smoking status: Every Day     Current packs/day: 0.50     Types: Cigarettes    Smokeless tobacco: Never    Tobacco comments:     1 pack lasts 2 days    Substance Use Topics    Alcohol use: Yes     Comment: occass    Drug use: Yes     Types: Marijuana        Radha Osei PA-C  02/18/25 0778

## 2025-02-20 LAB — BACTERIA UR CULT: NORMAL
